# Patient Record
Sex: FEMALE | Race: WHITE | NOT HISPANIC OR LATINO | URBAN - METROPOLITAN AREA
[De-identification: names, ages, dates, MRNs, and addresses within clinical notes are randomized per-mention and may not be internally consistent; named-entity substitution may affect disease eponyms.]

---

## 2024-07-18 DIAGNOSIS — R35.0 URINARY FREQUENCY: ICD-10-CM

## 2024-07-18 RX ORDER — TOLTERODINE 4 MG/1
4 CAPSULE, EXTENDED RELEASE ORAL DAILY
Qty: 30 CAPSULE | Refills: 11 | Status: SHIPPED | OUTPATIENT
Start: 2024-07-18

## 2024-07-18 RX ORDER — TOLTERODINE 4 MG/1
1 CAPSULE, EXTENDED RELEASE ORAL DAILY
COMMUNITY
Start: 2019-12-02 | End: 2024-07-18 | Stop reason: SDUPTHER

## 2024-07-25 ENCOUNTER — APPOINTMENT (OUTPATIENT)
Dept: UROLOGY | Facility: CLINIC | Age: 79
End: 2024-07-25

## 2024-07-26 PROBLEM — R32 INCONTINENCE IN FEMALE: Status: ACTIVE | Noted: 2024-07-26

## 2024-07-26 PROBLEM — R39.15 URINARY URGENCY: Status: ACTIVE | Noted: 2024-07-26

## 2024-07-26 PROBLEM — R35.0 URINARY FREQUENCY: Status: ACTIVE | Noted: 2024-07-26

## 2024-07-26 PROBLEM — N39.3 SUI (STRESS URINARY INCONTINENCE, FEMALE): Status: ACTIVE | Noted: 2024-07-26

## 2024-07-26 PROBLEM — N31.9 NEUROGENIC BLADDER: Status: ACTIVE | Noted: 2024-07-26

## 2024-07-26 ASSESSMENT — ENCOUNTER SYMPTOMS
EYES NEGATIVE: 1
CHILLS: 0
DIFFICULTY URINATING: 0
ENDOCRINE NEGATIVE: 1
PSYCHIATRIC NEGATIVE: 1
SHORTNESS OF BREATH: 0
NAUSEA: 0
FEVER: 0
COUGH: 0
ALLERGIC/IMMUNOLOGIC NEGATIVE: 1
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
## 2024-07-29 ENCOUNTER — APPOINTMENT (OUTPATIENT)
Dept: UROLOGY | Facility: CLINIC | Age: 79
End: 2024-07-29
Payer: MEDICARE

## 2024-07-29 VITALS
RESPIRATION RATE: 16 BRPM | HEART RATE: 67 BPM | DIASTOLIC BLOOD PRESSURE: 62 MMHG | SYSTOLIC BLOOD PRESSURE: 119 MMHG | WEIGHT: 137 LBS

## 2024-07-29 DIAGNOSIS — R35.0 URINARY FREQUENCY: ICD-10-CM

## 2024-07-29 DIAGNOSIS — R32 INCONTINENCE IN FEMALE: ICD-10-CM

## 2024-07-29 DIAGNOSIS — N39.3 SUI (STRESS URINARY INCONTINENCE, FEMALE): ICD-10-CM

## 2024-07-29 DIAGNOSIS — R39.15 URINARY URGENCY: ICD-10-CM

## 2024-07-29 PROCEDURE — 1036F TOBACCO NON-USER: CPT | Performed by: UROLOGY

## 2024-07-29 PROCEDURE — 99214 OFFICE O/P EST MOD 30 MIN: CPT | Performed by: UROLOGY

## 2024-07-29 PROCEDURE — 1159F MED LIST DOCD IN RCRD: CPT | Performed by: UROLOGY

## 2024-07-29 RX ORDER — AMLODIPINE BESYLATE 5 MG/1
5 TABLET ORAL
COMMUNITY
Start: 2024-07-02

## 2024-07-29 RX ORDER — ATORVASTATIN CALCIUM 20 MG/1
20 TABLET, FILM COATED ORAL NIGHTLY
COMMUNITY

## 2024-07-29 RX ORDER — LISINOPRIL 5 MG/1
1 TABLET ORAL
COMMUNITY
Start: 2023-08-15

## 2024-07-29 RX ORDER — LEVETIRACETAM 500 MG/1
500 TABLET ORAL 2 TIMES DAILY
COMMUNITY
Start: 2024-07-19

## 2024-07-29 RX ORDER — ALENDRONATE SODIUM 70 MG/1
70 TABLET ORAL
COMMUNITY
Start: 2024-07-02

## 2024-07-29 RX ORDER — TOLTERODINE 4 MG/1
4 CAPSULE, EXTENDED RELEASE ORAL DAILY
Qty: 30 CAPSULE | Refills: 11 | Status: SHIPPED | OUTPATIENT
Start: 2024-07-29

## 2024-07-29 RX ORDER — LABETALOL 200 MG/1
1 TABLET, FILM COATED ORAL 2 TIMES DAILY
COMMUNITY

## 2024-07-29 RX ORDER — NAPROXEN SODIUM 220 MG/1
81 TABLET, FILM COATED ORAL
COMMUNITY
Start: 2024-04-02

## 2024-07-29 ASSESSMENT — ENCOUNTER SYMPTOMS
WEAKNESS: 1
ARTHRALGIAS: 1

## 2025-07-23 ENCOUNTER — APPOINTMENT (OUTPATIENT)
Dept: UROLOGY | Facility: CLINIC | Age: 80
End: 2025-07-23
Payer: MEDICARE

## 2025-07-24 ENCOUNTER — APPOINTMENT (OUTPATIENT)
Dept: UROLOGY | Facility: CLINIC | Age: 80
End: 2025-07-24
Payer: MEDICARE